# Patient Record
Sex: FEMALE | Race: BLACK OR AFRICAN AMERICAN | NOT HISPANIC OR LATINO | Employment: FULL TIME | URBAN - METROPOLITAN AREA
[De-identification: names, ages, dates, MRNs, and addresses within clinical notes are randomized per-mention and may not be internally consistent; named-entity substitution may affect disease eponyms.]

---

## 2017-01-03 ENCOUNTER — PATIENT MESSAGE (OUTPATIENT)
Dept: RHEUMATOLOGY | Facility: CLINIC | Age: 22
End: 2017-01-03

## 2017-01-03 ENCOUNTER — PATIENT MESSAGE (OUTPATIENT)
Dept: OBSTETRICS AND GYNECOLOGY | Facility: CLINIC | Age: 22
End: 2017-01-03

## 2017-01-16 ENCOUNTER — OFFICE VISIT (OUTPATIENT)
Dept: RHEUMATOLOGY | Facility: CLINIC | Age: 22
End: 2017-01-16
Payer: COMMERCIAL

## 2017-01-16 ENCOUNTER — LAB VISIT (OUTPATIENT)
Dept: LAB | Facility: HOSPITAL | Age: 22
End: 2017-01-16
Attending: INTERNAL MEDICINE
Payer: COMMERCIAL

## 2017-01-16 ENCOUNTER — CLINICAL SUPPORT (OUTPATIENT)
Dept: OBSTETRICS AND GYNECOLOGY | Facility: CLINIC | Age: 22
End: 2017-01-16
Payer: COMMERCIAL

## 2017-01-16 VITALS
DIASTOLIC BLOOD PRESSURE: 73 MMHG | HEART RATE: 82 BPM | BODY MASS INDEX: 29.48 KG/M2 | SYSTOLIC BLOOD PRESSURE: 112 MMHG | WEIGHT: 161.19 LBS

## 2017-01-16 DIAGNOSIS — D75.89 BICYTOPENIA: ICD-10-CM

## 2017-01-16 DIAGNOSIS — M32.19 OTHER SYSTEMIC LUPUS ERYTHEMATOSUS WITH OTHER ORGAN INVOLVEMENT: ICD-10-CM

## 2017-01-16 DIAGNOSIS — Z79.899 LONG-TERM USE OF HIGH-RISK MEDICATION: ICD-10-CM

## 2017-01-16 DIAGNOSIS — M32.10 SYSTEMIC LUPUS ERYTHEMATOSUS WITH ORGAN SYSTEM INVOLVEMENT: ICD-10-CM

## 2017-01-16 DIAGNOSIS — M32.9 SLE (SYSTEMIC LUPUS ERYTHEMATOSUS): ICD-10-CM

## 2017-01-16 DIAGNOSIS — M32.10 SYSTEMIC LUPUS ERYTHEMATOSUS WITH ORGAN SYSTEM INVOLVEMENT: Primary | ICD-10-CM

## 2017-01-16 LAB
BILIRUB UR QL STRIP: NEGATIVE
CLARITY UR: CLEAR
COLOR UR: YELLOW
GLUCOSE UR QL STRIP: NEGATIVE
HGB UR QL STRIP: NEGATIVE
KETONES UR QL STRIP: NEGATIVE
LEUKOCYTE ESTERASE UR QL STRIP: NEGATIVE
NITRITE UR QL STRIP: NEGATIVE
PH UR STRIP: 6 [PH] (ref 5–8)
PROT UR QL STRIP: NEGATIVE
SP GR UR STRIP: <=1.005 (ref 1–1.03)
URN SPEC COLLECT METH UR: ABNORMAL

## 2017-01-16 PROCEDURE — 1159F MED LIST DOCD IN RCRD: CPT | Mod: S$GLB,,, | Performed by: INTERNAL MEDICINE

## 2017-01-16 PROCEDURE — 81003 URINALYSIS AUTO W/O SCOPE: CPT | Mod: PO

## 2017-01-16 PROCEDURE — 99214 OFFICE O/P EST MOD 30 MIN: CPT | Mod: S$GLB,,, | Performed by: INTERNAL MEDICINE

## 2017-01-16 PROCEDURE — 84156 ASSAY OF PROTEIN URINE: CPT

## 2017-01-16 PROCEDURE — 96372 THER/PROPH/DIAG INJ SC/IM: CPT | Mod: S$GLB,,, | Performed by: OBSTETRICS & GYNECOLOGY

## 2017-01-16 PROCEDURE — 99999 PR PBB SHADOW E&M-EST. PATIENT-LVL III: CPT | Mod: PBBFAC,,, | Performed by: INTERNAL MEDICINE

## 2017-01-16 RX ORDER — AZATHIOPRINE 50 MG/1
50 TABLET ORAL 2 TIMES DAILY
Qty: 60 TABLET | Refills: 1 | Status: SHIPPED | OUTPATIENT
Start: 2017-01-16

## 2017-01-16 RX ADMIN — MEDROXYPROGESTERONE ACETATE 150 MG: 150 INJECTION, SUSPENSION INTRAMUSCULAR at 08:01

## 2017-01-16 NOTE — PROGRESS NOTES
RHEUMATOLOGY CLINIC FOLLOW UP VISIT  Chief complaints:-  To follow up for lupus.      HPI:-  Dariela Hernandez a 21 y.o. pleasant female comes in for a follow up visit with above chief complaints.  She has significant systemic lupus erythematosus with arthralgias, photosensitivity, leukopenia, anemia, elevated ESR, elevated CRP.  She was started on CellCept after which her white count dropped which could be from lupus activity/rarely from CellCept. She also felt worsening of her fatigue and attributed it to cellcept. So, Cellcept was put on hold and she was given pulse steroid. Then her counts improved and she was restarted on low dose cellcept in last visit which again caused severe fatigue and was discontinued. She was supposed to have started imuran which was given in last , but reports that the pharmacy didn't have the script and so she didn't took it. On plaquenil without any problems today. No joint pains, skin rash, fatigue, muscle pain or chest pain.     Review of Systems   Constitutional: Negative for chills, fever, malaise/fatigue and weight loss.   HENT: Negative for congestion, ear discharge, ear pain, hearing loss, nosebleeds and sore throat.    Eyes: Negative for blurred vision, double vision, photophobia, discharge and redness.   Respiratory: Negative for cough, hemoptysis, sputum production and shortness of breath.    Cardiovascular: Negative for chest pain, palpitations and claudication.   Gastrointestinal: Negative for abdominal pain, constipation, diarrhea, melena, nausea and vomiting.   Genitourinary: Negative for dysuria, frequency, hematuria and urgency.   Musculoskeletal: Negative for back pain, falls, joint pain, myalgias and neck pain.   Skin: Negative for itching and rash.   Neurological: Negative for dizziness, tremors, sensory change, speech change, focal weakness, seizures, loss of consciousness, weakness and headaches.    Endo/Heme/Allergies: Negative for environmental allergies. Does not bruise/bleed easily.   Psychiatric/Behavioral: Negative for hallucinations and memory loss. The patient does not have insomnia.        Past Medical History   Diagnosis Date    Herpes     Lupus        History reviewed. No pertinent past surgical history.     Social History   Substance Use Topics    Smoking status: Never Smoker    Smokeless tobacco: None    Alcohol use Yes      Comment: rarely       Family History   Problem Relation Age of Onset    Breast cancer Paternal Grandmother     Lupus Paternal Grandmother     Ovarian cancer Neg Hx        Review of patient's allergies indicates:  No Known Allergies        Physical examination:-    Vitals:    01/16/17 1421   BP: 112/73   Pulse: 82   Weight: 73.1 kg (161 lb 2.5 oz)   PainSc: 0-No pain       Physical Exam   Constitutional: She is oriented to person, place, and time and well-developed, well-nourished, and in no distress. No distress.   HENT:   Head: Normocephalic and atraumatic.   Mouth/Throat: Oropharynx is clear and moist. No oropharyngeal exudate.   Eyes: Conjunctivae and EOM are normal. Pupils are equal, round, and reactive to light. Right eye exhibits no discharge. Left eye exhibits no discharge. No scleral icterus.   Neck: Normal range of motion. Neck supple.   Cardiovascular: Normal rate and intact distal pulses.    Pulmonary/Chest: Effort normal. No respiratory distress. She exhibits no tenderness.   Abdominal: Soft. There is no tenderness.   Musculoskeletal:   No synovitis in small joints. No effusion in large joints. No dactylitis.    Lymphadenopathy:     She has no cervical adenopathy.   Neurological: She is alert and oriented to person, place, and time. No cranial nerve deficit.   Skin: Skin is warm. No rash noted. She is not diaphoretic. No erythema. No pallor.   Psychiatric: Mood, memory, affect and judgment normal.   Nursing note and vitals reviewed.      Labs:-  Results  for ALAN BOOTHE (MRN [id number]) as of 7/7/2016 09:47   Ref. Range 11/19/2015 14:50 12/29/2015 15:43 3/8/2016 08:24 4/7/2016 09:17   JOSE M HEP-2 Titer Unknown Positive >=1:2560...      Anti-SSA Antibody Latest Ref Range: 0.00 - 19.99 .68 (H)      Anti-SSA Interpretation Latest Ref Range: Negative  Positive (A)      Anti-SSB Antibody Latest Ref Range: 0.00 - 19.99 EU 8.93      Anti-SSB Interpretation Latest Ref Range: Negative  Negative      ds DNA Ab Latest Ref Range: Negative 1:10  Positive 1:640 (A)      Anti Sm Antibody Latest Ref Range: 0.00 - 19.99 EU 29.05 (H)      Anti-Sm Interpretation Latest Ref Range: Negative  Positive (A)      Anti Sm/RNP Antibody Latest Ref Range: 0.00 - 19.99 .81 (H)      Anti-Sm/RNP Interpretation Latest Ref Range: Negative  Positive (A)      Complement (C-3) Latest Ref Range: 50 - 180 mg/dL 115 87 100 123   Complement (C-4) Latest Ref Range: 11 - 44 mg/dL 19 15 21 26   Results for ALAN BOOTHE (MRN [id number]) as of 7/7/2016 09:47   Ref. Range 11/19/2015 14:50 12/29/2015 15:43 3/8/2016 08:24 3/8/2016 08:24 3/17/2016 11:25 4/7/2016 09:17 4/7/2016 09:17   Sed Rate Latest Ref Range: 0 - 20 mm/Hr 110 (H) 42 (H) 55 (H) 55 (H) 52 (H) 70 (H)        Assessment/Plans:-  # SLE (systemic lupus erythematosus)  Multiple autoantibody positive lupus with hematological abnormalities like anemia, leukopenia and fatigue. TPMT normal. Start Imuran.  Consider restarting benlysta in future.    - CBC auto differential; Future  - Comprehensive metabolic panel; Future  - Urinalysis; Future  - Protein / creatinine ratio, urine; Future    # Long-term use of high-risk medication  Safety labs.  - Comprehensive metabolic panel; Future  - CBC auto differential; Future    # Bicytopenia  Refer to our hematologist  for expert opinion.   - Ambulatory Referral to Hematology / Oncology     # RTC in 3 months or sooner with symptoms.   Disclaimer: This note was prepared using voice  recognition system and is likely to have sound alike errors and is not proof read.  Please call me with any questions.

## 2017-01-16 NOTE — PROGRESS NOTES
Two pt identifiers verified  pt notified to wait in clinic for 15 minutes after receiving injection, pt verbalized understanding  150mg/mL of Depo Provera administered IM to pt's left ventrogluteal.   Pt tolerated well  Next injection scheduled for 4/10/17 at 0800

## 2017-01-16 NOTE — MR AVS SNAPSHOT
[facility]  [address] [place] [address]  Phone: [phone]  Fax: [phone]                  Dariela Gaitan   2017 2:30 PM   Office Visit    Description:  Female : 1995   Provider:  Bruno Zamora MD   Department:  [facility]ology           Reason for Visit     Disease Management           Diagnoses this Visit        Comments    Systemic lupus erythematosus with organ system involvement    -  Primary     Long-term use of high-risk medication         Other systemic lupus erythematosus with other organ involvement                To Do List           Future Appointments        Provider Department Dept Phone    4/10/2017 8:00 AM [phone]    10/2/2017 1:40 PM Selena Pérez MD [facility] Internal Medicine [phone]      Goals (5 Years of Data)     None       These Medications        Disp Refills Start End    azathioprine (IMURAN) 50 mg Tab 60 tablet 1 2017     Take 1 tablet (50 mg total) by mouth 2 (two) times daily. - Oral    Pharmacy: [facility] [address] [place] [address] [facility] [place] -  [address] AT [facility] Ph #: [phone]         [facility]     [facility] Nurse Care Line -  Assistance  Registered nurses in the [facility] provide clinical advisement, health education, appointment booking, and other advisory services.  Call for this free service at [phone].             Medications           Message regarding Medications     Verify the changes and/or additions to your medication regime listed below are the same as discussed with your clinician today.  If any of these changes or additions are incorrect, please notify your healthcare provider.             Verify that the below list of medications is an accurate representation of the medications you are currently taking.  If none reported, the list may be blank. If incorrect, please contact your  healthcare provider. Carry this list with you in case of emergency.           Current Medications     azathioprine (IMURAN) 50 mg Tab Take 1 tablet (50 mg total) by mouth 2 (two) times daily.    CALCIUM CARBONATE (CALCIUM 500 ORAL) Take by mouth once daily.    ERGOCALCIFEROL, VITAMIN D2, (VITAMIN D ORAL) Take 200 mg by mouth.    hydroxychloroquine (PLAQUENIL) 200 mg tablet Take 1 tablet (200 mg total) by mouth 2 (two) times daily.    IRON, FERROUS SULFATE, ORAL Take by mouth.    valacyclovir (VALTREX) 1000 MG tablet Take 1 tablet (1,000 mg total) by mouth every 12 (twelve) hours.    zinc gluconate 50 mg tablet Take 50 mg by mouth once daily.           Clinical Reference Information           Vital Signs - Last Recorded  Most recent update: 1/16/2017  2:22 PM by Phil Tripathi LPN    BP Pulse Wt BMI       112/73 82 73.1 kg (161 lb 2.5 oz) 29.48 kg/m2       Blood Pressure          Most Recent Value    BP  112/73      Allergies as of 1/16/2017     No Known Allergies      Immunizations Administered on Date of Encounter - 1/16/2017     None

## 2017-01-17 LAB
CREAT UR-MCNC: 62 MG/DL
PROT UR-MCNC: <7 MG/DL
PROT/CREAT RATIO, UR: NORMAL

## 2017-01-23 ENCOUNTER — TELEPHONE (OUTPATIENT)
Dept: RHEUMATOLOGY | Facility: CLINIC | Age: 22
End: 2017-01-23

## 2017-01-23 NOTE — TELEPHONE ENCOUNTER
----- Message from Phil Tripathi LPN sent at 1/23/2017  7:40 AM CST -----  Regarding: Notification of Unviewed Test Results  Contact: [phone]  Entered by Bruno Zamora MD at 1/17/2017  8:18 AM      Stable results.

## 2017-01-23 NOTE — TELEPHONE ENCOUNTER
----- Message from Phil Tripathi LPN sent at 1/23/2017  7:41 AM CST -----  Regarding: Notification of Unviewed Test Results  Contact: [phone]  Entered by Bruno Zamora MD at 1/17/2017  8:19 AM      Normal.

## 2017-01-30 ENCOUNTER — HOSPITAL ENCOUNTER (EMERGENCY)
Facility: HOSPITAL | Age: 22
Discharge: HOME | End: 2017-01-30
Attending: EMERGENCY MEDICINE
Payer: COMMERCIAL

## 2017-01-30 VITALS
HEIGHT: 62 IN | RESPIRATION RATE: 18 BRPM | OXYGEN SATURATION: 98 % | WEIGHT: 160 LBS | SYSTOLIC BLOOD PRESSURE: 129 MMHG | DIASTOLIC BLOOD PRESSURE: 73 MMHG | HEART RATE: 93 BPM | BODY MASS INDEX: 29.44 KG/M2 | TEMPERATURE: 98 F

## 2017-01-30 DIAGNOSIS — L50.9 URTICARIA: Primary | ICD-10-CM

## 2017-01-30 DIAGNOSIS — L29.9 ITCHING: ICD-10-CM

## 2017-01-30 PROCEDURE — 99283 EMERGENCY DEPT VISIT LOW MDM: CPT

## 2017-01-30 RX ORDER — DIPHENHYDRAMINE HCL 25 MG
25 CAPSULE ORAL EVERY 6 HOURS PRN
Qty: 20 CAPSULE | Refills: 0 | Status: SHIPPED | OUTPATIENT
Start: 2017-01-30

## 2017-01-30 RX ORDER — PREDNISONE 50 MG/1
50 TABLET ORAL DAILY
Qty: 5 TABLET | Refills: 0 | Status: SHIPPED | OUTPATIENT
Start: 2017-01-30 | End: 2017-02-04

## 2017-01-30 NOTE — ED PROVIDER NOTES
"SCRIBE #1 NOTE: I, James Rizvi Salazar, am scribing for, and in the presence of, Cornelio Membreno MD. I have scribed the entire note.      History      Chief Complaint   Patient presents with    Itching     generalized itching that began last night. hives yesterday that is better. some "tightness to hands and elbows", Hx of lupus       Review of patient's allergies indicates:  No Known Allergies     HPI   HPI    1/30/2017, 9:56 AM   History obtained from the patient      History of Present Illness: Dariela Gaitan is a 21 y.o. female patient with a PMHx of lupus, who presents to the Emergency Department for itching which onset gradually last night. Sxs are episodic and moderate in severity. Pt reports rash and itching to neck, hands, and bilateral upper extremities. Pt does not have rash at this time. Pt describes rash as large red raised areas. Pt states that she does not have any known allergies. There are no mitigating or exacerbating factors noted. No associated sxs.  Pt denies any fever, N/V/D, dizziness, abd pain, CP, facial swelling, numbness, trouble swallowing, cough, SOB, and all other sxs at this time. No further complaints or concerns at this time.       Arrival mode: Personal vehicle      PCP: Selena Pérez MD       Past Medical History:  Past Medical History   Diagnosis Date    Herpes     Lupus        Past Surgical History:  Unknown    Family History:  Family History   Problem Relation Age of Onset    Breast cancer Paternal Grandmother     Lupus Paternal Grandmother     Ovarian cancer Neg Hx        Social History:  Social History     Social History Main Topics    Smoking status: Never Smoker    Smokeless tobacco: Unknown    Alcohol use Yes      Comment: rarely    Drug use: No    Sexual activity: Yes     Partners: Male     Birth control/ protection: Injection       ROS   Review of Systems   Constitutional: Negative for fever.   HENT: Negative for facial swelling, sore throat and " "trouble swallowing.    Respiratory: Negative for cough and shortness of breath.    Cardiovascular: Negative for chest pain.   Gastrointestinal: Negative for abdominal pain, diarrhea, nausea and vomiting.   Genitourinary: Negative for dysuria.   Musculoskeletal: Negative for back pain.   Skin: Positive for rash (itching).   Neurological: Negative for syncope, weakness, numbness and headaches.   Hematological: Does not bruise/bleed easily.       Physical Exam    Initial Vitals   BP Pulse Resp Temp SpO2   01/30/17 0949 01/30/17 0949 01/30/17 0949 01/30/17 0949 01/30/17 0949   129/73 93 18 97.9 °F (36.6 °C) 98 %      Physical Exam  Nursing Notes and Vital Signs Reviewed.  Constitutional: Patient is in no acute distress. Awake and alert. Well-developed and well-nourished.  Head: Atraumatic. Normocephalic.  Eyes: PERRL. EOM intact. Conjunctivae are not pale. No scleral icterus.  ENT: Mucous membranes are moist. Oropharynx is clear and symmetric.    Neck: Supple. Full ROM. No lymphadenopathy.  Cardiovascular: Regular rate. Regular rhythm. No murmurs, rubs, or gallops. Distal pulses are 2+ and symmetric.  Pulmonary/Chest: No respiratory distress. Clear to auscultation bilaterally. No wheezing, rales, or rhonchi.  Abdominal: Soft and non-distended.  There is no tenderness.  No rebound, guarding, or rigidity.  Good bowel sounds.    Genitourinary: No CVA tenderness  Musculoskeletal: Moves all extremities. No obvious deformities. No edema. No calf tenderness.  Skin: Warm and dry. No rash appreciated currently.  Neurological:  Alert, awake, and appropriate.  Normal speech.  No acute focal neurological deficits are appreciated.  Psychiatric: Normal affect. Good eye contact. Appropriate in content.    ED Course    Procedures  ED Vital Signs:  Vitals:    01/30/17 0949   BP: 129/73   Pulse: 93   Resp: 18   Temp: 97.9 °F (36.6 °C)   TempSrc: Oral   SpO2: 98%   Weight: 72.6 kg (160 lb)   Height: 5' 2" (1.575 m)                The " Emergency Provider reviewed the vital signs and test results, which are outlined above.    ED Discussion     10:04 AM: Discussed plan of treatment with pt. Pt given the option to take Rx steroids. Gave pt all f/u and return to the ED instructions. All questions and concerns were addressed at this time. Pt understands and agrees to plan as discussed. Pt is stable for discharge.       ED Medication(s):  Medications - No data to display    Discharge Medication List as of 1/30/2017 10:01 AM      START taking these medications    Details   diphenhydrAMINE (BENADRYL) 25 mg capsule Take 1 each (25 mg total) by mouth every 6 (six) hours as needed for Itching., Starting 1/30/2017, Until Discontinued, Print      predniSONE (DELTASONE) 50 MG Tab Take 1 tablet (50 mg total) by mouth once daily., Starting 1/30/2017, Until Sat 2/4/17, Print             Follow-up Information     Follow up with Selena Pérez MD In 2 days.    Specialty:  Internal Medicine    Contact information:    [address] [place] [address] [address] [place] [id number] [phone]              Medical Decision Making              Scribe Attestation:   Scribe #1: I performed the above scribed service and the documentation accurately describes the services I performed. I attest to the accuracy of the note.    Attending:   Physician Attestation Statement for Scribe #1: I, Cornelio Membreno MD, personally performed the services described in this documentation, as scribed by James Salazar, in my presence, and it is both accurate and complete.          Clinical Impression       ICD-10-CM ICD-9-CM   1. Urticaria L50.9 708.9   2. Itching L29.9 698.9       Disposition:   Disposition: Discharged  Condition: Stable         Cornelio Membreno MD  01/30/17 1016

## 2017-01-30 NOTE — ED AVS SNAPSHOT
[facility] -   [address]  [place] [address]               Dariela Gaitan   2017  9:49 AM   ED    Description:  Female : 1995   Department:  [facility]           Your Care was Coordinated By:     Provider Role From To    Cornelio Membreno MD Attending Provider 17 0949 --      Reason for Visit     Itching           Diagnoses this Visit        Comments    Urticaria    -  Primary       ED Disposition     ED Disposition Condition Comment    Discharge             To Do List           Follow-up Information     Follow up with Selena Pérez MD In 2 days.    Specialty:  Internal Medicine    Contact information:    [address] [place] [address] [address] [place] [address]  [phone]         These Medications        Disp Refills Start End    predniSONE (DELTASONE) 50 MG Tab 5 tablet 0 2017    Take 1 tablet (50 mg total) by mouth once daily. - Oral    Pharmacy: [facility] [id number] [place] [address] [place] [address] AT [facility] Ph #: [phone]       diphenhydrAMINE (BENADRYL) 25 mg capsule 20 capsule 0 2017     Take 1 each (25 mg total) by mouth every 6 (six) hours as needed for Itching. - Oral    [facility] [address] [facility] [place] [address] [facility] Ph #: [phone]         [facility]     [facility] Care Line -  Assistance  Registered nurses in the [facility] provide clinical advisement, health education, appointment booking, and other advisory services.  Call for this free service at [phone].             Medications           Message regarding Medications     Verify the changes and/or additions to your medication regime listed below are the same as discussed with your clinician today.  If any of these changes or additions are incorrect, please notify your healthcare provider.        START taking  "these NEW medications        Refills    predniSONE (DELTASONE) 50 MG Tab 0    Sig: Take 1 tablet (50 mg total) by mouth once daily.    Class: Print    Route: Oral    diphenhydrAMINE (BENADRYL) 25 mg capsule 0    Sig: Take 1 each (25 mg total) by mouth every 6 (six) hours as needed for Itching.    Class: Print    Route: Oral           Verify that the below list of medications is an accurate representation of the medications you are currently taking.  If none reported, the list may be blank. If incorrect, please contact your healthcare provider. Carry this list with you in case of emergency.           Current Medications     azathioprine (IMURAN) 50 mg Tab Take 1 tablet (50 mg total) by mouth 2 (two) times daily.    CALCIUM CARBONATE (CALCIUM 500 ORAL) Take by mouth once daily.    diphenhydrAMINE (BENADRYL) 25 mg capsule Take 1 each (25 mg total) by mouth every 6 (six) hours as needed for Itching.    ERGOCALCIFEROL, VITAMIN D2, (VITAMIN D ORAL) Take 200 mg by mouth.    hydroxychloroquine (PLAQUENIL) 200 mg tablet Take 1 tablet (200 mg total) by mouth 2 (two) times daily.    IRON, FERROUS SULFATE, ORAL Take by mouth.    medroxyPROGESTERone (DEPO-PROVERA) injection 150 mg Inject 1 mL (150 mg total) into the muscle every 3 (three) months.    medroxyPROGESTERone (DEPO-PROVERA) injection 150 mg Inject 1 mL (150 mg total) into the muscle every 3 (three) months.    predniSONE (DELTASONE) 50 MG Tab Take 1 tablet (50 mg total) by mouth once daily.    valacyclovir (VALTREX) 1000 MG tablet Take 1 tablet (1,000 mg total) by mouth every 12 (twelve) hours.    zinc gluconate 50 mg tablet Take 50 mg by mouth once daily.           Clinical Reference Information           Your Vitals Were     BP Pulse Temp Resp Height Weight    129/73 (BP Location: Right arm, Patient Position: Sitting) 93 97.9 °F (36.6 °C) (Oral) 18 5' 2" (1.575 m) 72.6 kg (160 lb)    SpO2 BMI             98% 29.26 kg/m2         Allergies as of 1/30/2017     No Known " Allergies      Immunizations Administered on Date of Encounter - 1/30/2017     None      ED Micro, Lab, POCT     None      ED Imaging Orders     None        Discharge Instructions         Hives (Adult)  Hives are pink or red bumps on the skin. These bumps are also known as wheals. The bumps can itch, burn, or sting. Hives can occur anywhere on the body. They vary in size and shape and can form in clusters. Individual hives can appear and go away quickly. New hives may develop as old ones fade. Hives are common and usually harmless. Occasionally hives are a sign of a serious allergy.  Hives are often caused by an allergic reaction. It may be an allergic reaction to foods such as fruit, shellfish, chocolate, nuts, or tomatoes. It may be a reaction to pollens, animal fur, or mold spores. Medications, chemicals, and insect bites can also cause hives. And hives can be caused by hot sun or cold air. The cause of hives can be difficult to find.  You may be given medications to relieve swelling and itching. Follow all instructions when using these medications. The hives will fade in a few days, but can last up to 2 weeks.  Home care  Follow these tips:  · Try to find the cause of the hives and eliminate it. Discuss possible causes with your health care provider. Future reactions to the same allergen may be worse.  · Dont scratch the hives. Scratching will delay healing. To reduce itching, apply cool, wet compresses to the skin.  · Dress in soft, loose cotton clothing.  · Dont bathe in hot water. This can make the itching worse.  · Apply an ice pack or cool pack wrapped in a thin towel to your skin. This will help reduce redness and itching.  · Try a topical spray or cream with benzocaine to help reduce itching.  · Use oral diphenhydramine to help reduce itching. This is an antihistamine you can buy at drug and grocery stores. It can make you sleepy, so use lower doses during the daytime. Or you can use loratadine. This  is an antihistamine that will not make you sleepy. Dont use diphenhydramine if you have glaucoma or have trouble urinating because of an enlarged prostate.  Follow-up care  Follow up with your health care provider if your symptoms don't get better in 2 days. Ask your provider about allergy testing if you have had a severe reaction, or have had several episodes of hives. He or she can use the allergy testing to find out what you are allergic to.  When to seek medical advice  Call your health care provider right away if any of these occur:  · Fever of 100.4°F (38.0°C) or higher  · Swelling of the face, throat, or tongue  · Trouble breathing or swallowing  · Redness, swelling, or pain  · Foul-smelling fluid coming from the rash  · Dizziness, weakness, or fainting  © 4320-6714 [employer]. [address] [place] [address]. All rights reserved. This information is not intended as a substitute for professional medical care. Always follow your healthcare professional's instructions.          Your Scheduled Appointments     Apr 10, 2017  8:00 AM CDT   Nurse Visit with OB GYN [facility]UR[facility] - OB/ GYN ([facility])    [address] [place] [address]   [phone]            Oct 02, 2017  1:40 PM CDT   Established Patient Visit with MD LUPIS [facility] - Internal Medicine ([facility] [address]   [phone]               [facility] -  complies with applicable Federal civil rights laws and does not discriminate on the basis of race, color, national origin, age, disability, or sex.        Language Assistance Services     ATTENTION: Language assistance services are available, free of charge. Please call [phone].      ATENCIÓN: Si habla charline, tiene a lacey disposición servicios gratuitos de asistencia lingüística. Llame al [phone].     CHÚ Ý: N?u b?n nói Ti?ng Vi?t, có các d?ch v? h? tr? ngôn ng? mi?n  phí dành cho b?stephany. G?i s? [phone].

## 2017-01-30 NOTE — DISCHARGE INSTRUCTIONS
Hives (Adult)  Hives are pink or red bumps on the skin. These bumps are also known as wheals. The bumps can itch, burn, or sting. Hives can occur anywhere on the body. They vary in size and shape and can form in clusters. Individual hives can appear and go away quickly. New hives may develop as old ones fade. Hives are common and usually harmless. Occasionally hives are a sign of a serious allergy.  Hives are often caused by an allergic reaction. It may be an allergic reaction to foods such as fruit, shellfish, chocolate, nuts, or tomatoes. It may be a reaction to pollens, animal fur, or mold spores. Medications, chemicals, and insect bites can also cause hives. And hives can be caused by hot sun or cold air. The cause of hives can be difficult to find.  You may be given medications to relieve swelling and itching. Follow all instructions when using these medications. The hives will fade in a few days, but can last up to 2 weeks.  Home care  Follow these tips:  · Try to find the cause of the hives and eliminate it. Discuss possible causes with your health care provider. Future reactions to the same allergen may be worse.  · Dont scratch the hives. Scratching will delay healing. To reduce itching, apply cool, wet compresses to the skin.  · Dress in soft, loose cotton clothing.  · Dont bathe in hot water. This can make the itching worse.  · Apply an ice pack or cool pack wrapped in a thin towel to your skin. This will help reduce redness and itching.  · Try a topical spray or cream with benzocaine to help reduce itching.  · Use oral diphenhydramine to help reduce itching. This is an antihistamine you can buy at drug and grocery stores. It can make you sleepy, so use lower doses during the daytime. Or you can use loratadine. This is an antihistamine that will not make you sleepy. Dont use diphenhydramine if you have glaucoma or have trouble urinating because of an enlarged prostate.  Follow-up care  Follow up  with your health care provider if your symptoms don't get better in 2 days. Ask your provider about allergy testing if you have had a severe reaction, or have had several episodes of hives. He or she can use the allergy testing to find out what you are allergic to.  When to seek medical advice  Call your health care provider right away if any of these occur:  · Fever of 100.4°F (38.0°C) or higher  · Swelling of the face, throat, or tongue  · Trouble breathing or swallowing  · Redness, swelling, or pain  · Foul-smelling fluid coming from the rash  · Dizziness, weakness, or fainting  © 3329-6626 [employer]. [address] [place] [address], [place] [address]. All rights reserved. This information is not intended as a substitute for professional medical care. Always follow your healthcare professional's instructions.

## 2017-03-29 ENCOUNTER — PATIENT MESSAGE (OUTPATIENT)
Dept: OBSTETRICS AND GYNECOLOGY | Facility: CLINIC | Age: 22
End: 2017-03-29

## 2017-04-04 ENCOUNTER — PATIENT MESSAGE (OUTPATIENT)
Dept: RHEUMATOLOGY | Facility: CLINIC | Age: 22
End: 2017-04-04

## 2017-04-10 ENCOUNTER — CLINICAL SUPPORT (OUTPATIENT)
Dept: OBSTETRICS AND GYNECOLOGY | Facility: CLINIC | Age: 22
End: 2017-04-10
Payer: COMMERCIAL

## 2017-04-10 PROCEDURE — 96372 THER/PROPH/DIAG INJ SC/IM: CPT | Mod: S$GLB,,, | Performed by: OBSTETRICS & GYNECOLOGY

## 2017-04-10 RX ADMIN — MEDROXYPROGESTERONE ACETATE 150 MG: 150 INJECTION, SUSPENSION INTRAMUSCULAR at 08:04

## 2017-04-10 NOTE — PROGRESS NOTES
Two pt identifiers verified  pt notified to wait in clinic for 15 minutes after receiving injection, pt verbalized understanding  150mg/mL of Depo Provera administered IM to pt's left ventrogluteal.   Pt tolerated well  Next injection scheduled for 07/06/2017 at 0800

## 2017-04-25 ENCOUNTER — HOSPITAL ENCOUNTER (EMERGENCY)
Facility: HOSPITAL | Age: 22
Discharge: HOME | End: 2017-04-25
Attending: EMERGENCY MEDICINE
Payer: COMMERCIAL

## 2017-04-25 VITALS
RESPIRATION RATE: 19 BRPM | SYSTOLIC BLOOD PRESSURE: 131 MMHG | HEART RATE: 92 BPM | HEIGHT: 62 IN | DIASTOLIC BLOOD PRESSURE: 71 MMHG | BODY MASS INDEX: 29.44 KG/M2 | OXYGEN SATURATION: 100 % | TEMPERATURE: 99 F | WEIGHT: 160 LBS

## 2017-04-25 DIAGNOSIS — R07.9 CHEST PAIN: ICD-10-CM

## 2017-04-25 DIAGNOSIS — R07.89 ATYPICAL CHEST PAIN: Primary | ICD-10-CM

## 2017-04-25 DIAGNOSIS — R52 PAIN: ICD-10-CM

## 2017-04-25 PROCEDURE — 93010 ELECTROCARDIOGRAM REPORT: CPT | Mod: ,,, | Performed by: INTERNAL MEDICINE

## 2017-04-25 PROCEDURE — 99284 EMERGENCY DEPT VISIT MOD MDM: CPT | Mod: 25

## 2017-04-25 PROCEDURE — 93005 ELECTROCARDIOGRAM TRACING: CPT

## 2017-04-25 NOTE — ED AVS SNAPSHOT
[facility]  [address] [facility] [place] [address]               Dariela Gaitan   2017  6:19 PM   ED    Description:  Female : 1995   Department:  [facility]            Your Care was Coordinated By:     Provider Role From To    Fidencio Mercer Jr., MD Attending Provider 17 1829 --      Reason for Visit     Chest Pain           Diagnoses this Visit        Comments    Atypical chest pain    -  Primary     Chest pain         Pain           ED Disposition     ED Disposition Condition Comment    Discharge             To Do List           Follow-up Information     Follow up with Selena Pérez MD. Call in 2 days.    Specialty:  Internal Medicine    Contact information:    [address] [place] [address] DR Katelynn BLOOM [address]  [phone]        [facility]     [facility] -  Assistance  Unless otherwise directed by your provider, please contact [facility], our nurse care line that is available for  assistance.     Registered nurses in the [facility] provide: appointment scheduling, clinical advisement, health education, and other advisory services.  Call: [phone] (toll free)               Medications           Message regarding Medications     Verify the changes and/or additions to your medication regime listed below are the same as discussed with your clinician today.  If any of these changes or additions are incorrect, please notify your healthcare provider.             Verify that the below list of medications is an accurate representation of the medications you are currently taking.  If none reported, the list may be blank. If incorrect, please contact your healthcare provider. Carry this list with you in case of emergency.           Current Medications     azathioprine (IMURAN) 50 mg Tab Take 1 tablet (50 mg total) by mouth 2 (two) times daily.    CALCIUM CARBONATE (CALCIUM 500 ORAL)  "Take by mouth once daily.    diphenhydrAMINE (BENADRYL) 25 mg capsule Take 1 each (25 mg total) by mouth every 6 (six) hours as needed for Itching.    ERGOCALCIFEROL, VITAMIN D2, (VITAMIN D ORAL) Take 200 mg by mouth.    hydroxychloroquine (PLAQUENIL) 200 mg tablet Take 1 tablet (200 mg total) by mouth 2 (two) times daily.    IRON, FERROUS SULFATE, ORAL Take by mouth.    medroxyPROGESTERone (DEPO-PROVERA) injection 150 mg Inject 1 mL (150 mg total) into the muscle every 3 (three) months.    medroxyPROGESTERone (DEPO-PROVERA) injection 150 mg Inject 1 mL (150 mg total) into the muscle every 3 (three) months.    valacyclovir (VALTREX) 1000 MG tablet Take 1 tablet (1,000 mg total) by mouth every 12 (twelve) hours.    zinc gluconate 50 mg tablet Take 50 mg by mouth once daily.           Clinical Reference Information           Your Vitals Were     BP Pulse Temp Resp Height Weight    131/71 (BP Location: Right arm, Patient Position: Sitting) 92 98.6 °F (37 °C) (Oral) 19 5' 2" (1.575 m) 72.6 kg (160 lb)    SpO2 BMI             100% 29.26 kg/m2         Allergies as of 4/25/2017     No Known Allergies      Immunizations Administered on Date of Encounter - 4/25/2017     None      ED Micro, Lab, POCT     None      ED Imaging Orders     Start Ordered       Status Ordering Provider    04/25/17 1833 04/25/17 1832  X-Ray Chest PA And Lateral  1 time imaging      Final result         Discharge Instructions         Noncardiac Chest Pain    Based on your visit today, the health care provider doesnt know what is causing your chest pain. In most cases, people who come to the emergency department with chest pain dont have a problem with their heart. Instead, the pain is caused by other conditions. These may be problems with the lungs, muscles, bones, digestive tract, nerves, or mental health.  Lung problems  · Inflammation around the lungs (pleurisy)  · Collapsed lung (pneumothorax)  · Fluid around the lungs (pleural " effusion)  · Lung cancer. This is a rare cause of chest pain.  Muscle or bone problems  · Inflamed cartilage between the ribs (pleurisy)  · Fibromyalgia  · Rheumatoid arthritis  Digestive system problems  · Reflux  · Stomach ulcer  · Spasms of the esophagus  · Gall stones  · Gallbladder inflammation  Mental health conditions  · Panic or anxiety attacks  · Emotional distress  Your condition doesnt seem serious and your pain doesnt appear to be coming from your heart. But sometimes the signs of a serious problem take more time to appear. Watch for the warning signs listed below.  Home care  Follow these guidelines when caring for yourself at home:  · Rest today and avoid strenuous activity.  · Take any prescribed medicine as directed.  Follow-up care  Follow up with your health care provider, or as advised, if you dont start to feel better within 24 hours.  When to seek medical advice  Call your health care provider right away if any of these occur:  · A change in the type of pain. Call if it feels different, becomes more serious, lasts longer, or begins to spread into your shoulder, arm, neck, jaw, or back.  · Shortness of breath  · You feel more pain when you breathe  · Cough with dark-colored mucus or blood  · Weakness, dizziness, or fainting  · Fever of 100.4ºF (38ºC) or higher, or as directed by your health care provider  · Swelling, pain, or redness in one leg  Date Last Reviewed: 11/24/2014  © 6709-8016 [employer]. [address] [place] [address]. All rights reserved. This information is not intended as a substitute for professional medical care. Always follow your healthcare professional's instructions.          Your Scheduled Appointments     Jul 03, 2017  8:00 AM CDT   Nurse Visit with OB GYN NURSE, SUPRIYA Gaines - OB/ GYN ([facility])    [address] [facility] [id number] [phone]            Oct 02, 2017  1:40 PM CDT   Established Patient Visit with  MD LUPIS Salgado'Beck - Internal Medicine ([facility])    [address] [facility] [address] [phone]               [facility] complies with applicable Federal civil rights laws and does not discriminate on the basis of race, color, national origin, age, disability, or sex.        Language Assistance Services     ATTENTION: Language assistance services are available, free of charge. Please call [phone].      ATENCIÓN: Si habla español, tiene a lacey disposición servicios gratuitos de asistencia lingüística. Llame al [phone].     CHÚ Ý: N?u b?n nói Ti?ng Vi?t, có các d?ch v? h? tr? ngôn ng? mi?n phí dành cho b?n. G?i s? [phone].

## 2017-04-25 NOTE — DISCHARGE INSTRUCTIONS
Noncardiac Chest Pain    Based on your visit today, the health care provider doesnt know what is causing your chest pain. In most cases, people who come to the emergency department with chest pain dont have a problem with their heart. Instead, the pain is caused by other conditions. These may be problems with the lungs, muscles, bones, digestive tract, nerves, or mental health.  Lung problems  · Inflammation around the lungs (pleurisy)  · Collapsed lung (pneumothorax)  · Fluid around the lungs (pleural effusion)  · Lung cancer. This is a rare cause of chest pain.  Muscle or bone problems  · Inflamed cartilage between the ribs (pleurisy)  · Fibromyalgia  · Rheumatoid arthritis  Digestive system problems  · Reflux  · Stomach ulcer  · Spasms of the esophagus  · Gall stones  · Gallbladder inflammation  Mental health conditions  · Panic or anxiety attacks  · Emotional distress  Your condition doesnt seem serious and your pain doesnt appear to be coming from your heart. But sometimes the signs of a serious problem take more time to appear. Watch for the warning signs listed below.  Home care  Follow these guidelines when caring for yourself at home:  · Rest today and avoid strenuous activity.  · Take any prescribed medicine as directed.  Follow-up care  Follow up with your health care provider, or as advised, if you dont start to feel better within 24 hours.  When to seek medical advice  Call your health care provider right away if any of these occur:  · A change in the type of pain. Call if it feels different, becomes more serious, lasts longer, or begins to spread into your shoulder, arm, neck, jaw, or back.  · Shortness of breath  · You feel more pain when you breathe  · Cough with dark-colored mucus or blood  · Weakness, dizziness, or fainting  · Fever of 100.4ºF (38ºC) or higher, or as directed by your health care provider  · Swelling, pain, or redness in one leg  Date Last Reviewed: 11/24/2014  © 9730-3301  [employer]. [address] [place] [address], [place] [address]. All rights reserved. This information is not intended as a substitute for professional medical care. Always follow your healthcare professional's instructions.

## 2017-04-25 NOTE — ED PROVIDER NOTES
SCRIBE #1 NOTE: I, James Salazar, am scribing for, and in the presence of, Fidencio Mercer Jr., MD. I have scribed the entire note.      History      Chief Complaint   Patient presents with    Chest Pain     right sided chest pain that radiates to right arm x a few hours, describes pain as burning; also c/o left eye lid pain       Review of patient's allergies indicates:  No Known Allergies     HPI   HPI    4/25/2017, 6:36 PM   History obtained from the patient      History of Present Illness: Dariela Gaitan is a 21 y.o. female patient with a PMHx of lupus, RA, who presents to the Emergency Department for right sided CP which onset gradually today PTA. Sxs are constant and moderate in severity. Pt reports pain radiates to her back. There are no mitigating or exacerbating factors noted. Associated sxs include tingling in RUE.  Pt denies any fever, N/V/D, HA, SOB, cough, trauma, abd pain, arm pain,  and all other sxs at this time. No further complaints or concerns at this time.       Arrival mode: Personal vehicle     PCP: Selena Pérez MD       Past Medical History:  Past Medical History:   Diagnosis Date    Herpes     Lupus        Past Surgical History:  unknown    Family History:  Family History   Problem Relation Age of Onset    Breast cancer Paternal Grandmother     Lupus Paternal Grandmother     Ovarian cancer Neg Hx        Social History:  Social History     Social History Main Topics    Smoking status: Never Smoker    Smokeless tobacco: unknown    Alcohol use Yes      Comment: rarely    Drug use: No    Sexual activity: Yes     Partners: Male     Birth control/ protection: Injection       ROS   Review of Systems   Constitutional: Negative for fever.   HENT: Negative for sore throat.    Respiratory: Negative for shortness of breath.    Cardiovascular: Positive for chest pain (Right).   Gastrointestinal: Negative for abdominal pain, constipation, diarrhea, nausea and vomiting.  "  Genitourinary: Negative for dysuria.   Musculoskeletal: Negative for back pain.   Skin: Negative for rash.   Neurological: Negative for dizziness, seizures, syncope, weakness, numbness and headaches.        (+) RUE tingling   Hematological: Does not bruise/bleed easily.       Physical Exam    Initial Vitals   BP Pulse Resp Temp SpO2   04/25/17 1815 04/25/17 1815 04/25/17 1815 04/25/17 1815 04/25/17 1815   131/71 92 19 98.6 °F (37 °C) 100 %      Physical Exam  Nursing Notes and Vital Signs Reviewed.  Constitutional: Patient is in no acute distress. Awake and alert. Well-developed and well-nourished.  Head: Atraumatic. Normocephalic.  Eyes: PERRL. EOM intact. Conjunctivae are not pale. No scleral icterus.  ENT: Mucous membranes are moist. Oropharynx is clear and symmetric.    Neck: Supple. Full ROM. No lymphadenopathy.  Cardiovascular: Regular rate. Regular rhythm. No murmurs, rubs, or gallops. Distal pulses are 2+ and symmetric.  Pulmonary/Chest: No respiratory distress. Clear to auscultation bilaterally. No wheezing, rales, or rhonchi.  Abdominal: Soft and non-distended.  There is no tenderness.  No rebound, guarding, or rigidity. Good bowel sounds.  Genitourinary: No CVA tenderness  Musculoskeletal: Moves all extremities. No obvious deformities. No edema. No calf tenderness.  Skin: Warm and dry.  Neurological:  Alert, awake, and appropriate.  Normal speech.  No acute focal neurological deficits are appreciated.  Psychiatric: Normal affect. Good eye contact. Appropriate in content.    ED Course    Procedures  ED Vital Signs:  Vitals:    04/25/17 1815   BP: 131/71   Pulse: 92   Resp: 19   Temp: 98.6 °F (37 °C)   TempSrc: Oral   SpO2: 100%   Weight: 72.6 kg (160 lb)   Height: 5' 2" (1.575 m)     Imaging Results:  Imaging Results         X-Ray Chest PA And Lateral (Final result) Result time:  04/25/17 18:52:45    Final result by Damion Yap MD (04/25/17 18:52:45)    Impression:         No acute cardiopulmonary " disease.            Electronically signed by: ANGEL KRAFT MD  Date:     04/25/17  Time:    18:52     Narrative:    Exam: Two-view chest radiograph    Clinical History: R52 Pain, unspecified.  .      Findings:   The lungs are clear. The cardiac silhouette is within normal limits.                    The EKG was ordered, reviewed, and independently interpreted by the ED provider.  Interpretation time: 18:25  Rate: 81 BPM  Rhythm: normal sinus rhythm  Interpretation: No acute ST changes. No STEMI.      The Emergency Provider reviewed the vital signs and test results, which are outlined above.    ED Discussion     6:59 PM: Reassessed pt. Discussed with pt all pertinent ED information and results. Discussed plan of treatment with pt. Gave pt all f/u and return to the ED instructions. All questions and concerns were addressed at this time. Pt understands and agrees to plan as discussed. Pt is stable for discharge.     I have discussed with patient and/or family/caretaker chest pain precautions, specifically to return for worsening chest pain, shortness of breath, fever, or any concern.  I have low suspicion for cardiopulmonary, vascular, infectious, respiratory, or other emergent medical condition based on my evaluation in the ED.    ED Medication(s):  Medications - No data to display    Discharge Medication List as of 4/25/2017  6:57 PM          Follow-up Information     Follow up with Selena Pérez MD. Call in 2 days.    Specialty:  Internal Medicine    Contact information:    [address] [place] [address] DR Katelynn BLOOM [id number] [phone]              Medical Decision Making    Medical Decision Making:   Clinical Tests:   Radiological Study: Ordered and Reviewed  Medical Tests: Reviewed and Ordered           Scribe Attestation:   Scribe #1: I performed the above scribed service and the documentation accurately describes the services I performed. I attest to the accuracy of the note.    Attending:   Physician Attestation  Statement for Scribe #1: I, Fidencio Mercer Jr., MD, personally performed the services described in this documentation, as scribed by James Salazar, in my presence, and it is both accurate and complete.          Clinical Impression       ICD-10-CM ICD-9-CM   1. Atypical chest pain R07.89 786.59   2. Chest pain R07.9 786.50   3. Pain R52 780.96       Disposition:   Disposition: Discharged  Condition: Stable         Fidencio Mercer Jr., MD  04/25/17 4110

## 2017-07-24 ENCOUNTER — PATIENT MESSAGE (OUTPATIENT)
Dept: RHEUMATOLOGY | Facility: CLINIC | Age: 22
End: 2017-07-24

## 2017-07-24 DIAGNOSIS — M32.19 OTHER SYSTEMIC LUPUS ERYTHEMATOSUS WITH OTHER ORGAN INVOLVEMENT: ICD-10-CM

## 2017-07-24 DIAGNOSIS — M32.10 SYSTEMIC LUPUS ERYTHEMATOSUS WITH ORGAN SYSTEM INVOLVEMENT: Primary | ICD-10-CM

## 2017-07-24 RX ORDER — HYDROXYCHLOROQUINE SULFATE 200 MG/1
200 TABLET, FILM COATED ORAL 2 TIMES DAILY
Qty: 60 TABLET | Refills: 3 | Status: SHIPPED | OUTPATIENT
Start: 2017-07-24

## 2017-07-24 NOTE — TELEPHONE ENCOUNTER
Restart plaquenil. Please advice patient to go to [facility] clinic in the mean time if she couldn't come here, if her lupus flares / call us immediatly. Thanks.

## 2017-07-27 ENCOUNTER — PATIENT MESSAGE (OUTPATIENT)
Dept: OBSTETRICS AND GYNECOLOGY | Facility: CLINIC | Age: 22
End: 2017-07-27

## 2017-07-27 DIAGNOSIS — Z86.19 HISTORY OF HERPES GENITALIS: ICD-10-CM

## 2017-07-27 RX ORDER — VALACYCLOVIR HYDROCHLORIDE 1 G/1
1000 TABLET, FILM COATED ORAL EVERY 12 HOURS
Qty: 10 TABLET | Refills: 6 | Status: SHIPPED | OUTPATIENT
Start: 2017-07-27 | End: 2018-07-12 | Stop reason: SDUPTHER

## 2017-08-17 ENCOUNTER — PATIENT MESSAGE (OUTPATIENT)
Dept: OBSTETRICS AND GYNECOLOGY | Facility: CLINIC | Age: 22
End: 2017-08-17

## 2017-10-11 ENCOUNTER — PATIENT MESSAGE (OUTPATIENT)
Dept: RHEUMATOLOGY | Facility: CLINIC | Age: 22
End: 2017-10-11

## 2018-07-12 ENCOUNTER — PATIENT MESSAGE (OUTPATIENT)
Dept: OBSTETRICS AND GYNECOLOGY | Facility: CLINIC | Age: 23
End: 2018-07-12

## 2018-07-12 DIAGNOSIS — Z86.19 HISTORY OF HERPES GENITALIS: ICD-10-CM

## 2018-07-12 RX ORDER — VALACYCLOVIR HYDROCHLORIDE 1 G/1
1000 TABLET, FILM COATED ORAL EVERY 12 HOURS
Qty: 10 TABLET | Refills: 6 | Status: SHIPPED | OUTPATIENT
Start: 2018-07-12

## 2019-08-20 ENCOUNTER — OFFICE VISIT (OUTPATIENT)
Dept: URGENT CARE | Facility: CLINIC | Age: 24
End: 2019-08-20
Payer: COMMERCIAL

## 2019-08-20 VITALS
TEMPERATURE: 98 F | BODY MASS INDEX: 28.71 KG/M2 | RESPIRATION RATE: 19 BRPM | OXYGEN SATURATION: 100 % | HEIGHT: 62 IN | DIASTOLIC BLOOD PRESSURE: 75 MMHG | WEIGHT: 156 LBS | SYSTOLIC BLOOD PRESSURE: 115 MMHG | HEART RATE: 86 BPM

## 2019-08-20 DIAGNOSIS — H57.89 IRRITATION OF RIGHT EYE: Primary | ICD-10-CM

## 2019-08-20 PROCEDURE — 3008F PR BODY MASS INDEX (BMI) DOCUMENTED: ICD-10-PCS | Mod: CPTII,S$GLB,, | Performed by: NURSE PRACTITIONER

## 2019-08-20 PROCEDURE — 99214 PR OFFICE/OUTPT VISIT, EST, LEVL IV, 30-39 MIN: ICD-10-PCS | Mod: S$GLB,,, | Performed by: NURSE PRACTITIONER

## 2019-08-20 PROCEDURE — 99214 OFFICE O/P EST MOD 30 MIN: CPT | Mod: S$GLB,,, | Performed by: NURSE PRACTITIONER

## 2019-08-20 PROCEDURE — 3008F BODY MASS INDEX DOCD: CPT | Mod: CPTII,S$GLB,, | Performed by: NURSE PRACTITIONER

## 2019-08-20 RX ORDER — METHYLPREDNISOLONE 4 MG/1
4 TABLET ORAL
Qty: 1 PACKAGE | Refills: 0 | Status: SHIPPED | OUTPATIENT
Start: 2019-08-20 | End: 2019-08-26

## 2019-08-20 RX ORDER — ACETAMINOPHEN AND CODEINE PHOSPHATE 120; 12 MG/5ML; MG/5ML
0.35 SOLUTION ORAL
COMMUNITY
Start: 2018-09-21 | End: 2019-09-21

## 2019-08-20 NOTE — PATIENT INSTRUCTIONS
Conjunctivitis, Nonspecific    The membrane that covers the white part of your eye (the conjunctiva) is inflamed. Inflammation happens when your body responds to an injury, allergic reaction, infection, or illness. Symptoms of inflammation in the eye may include redness, irritation, itching, swelling, or burning. These symptoms should go away within the next 24 hours. Conjunctivitis may be related to a particle that was in your eye. If so, it may wash out with your tears or irrigation treatment. Being exposed to liquid chemicals or fumes may also cause this reaction.   Home care  · Apply a cold pack (ice in a plastic bag, wrapped in a towel) over the eye for 20 minutes at a time. This will reduce pain.  · Artificial tears may be prescribed to reduce irritation or redness.  These should be used 3 to 4 times a day.  · You may use acetaminophen or ibuprofen to control pain, unless another medicine was prescribed.(Note: If you have chronic liver or kidney disease, or if you have ever had a stomach ulcer or gastrointestinal bleeding, talk with your healthcare provider before using these medicines.)  Follow-up care  Follow up with your healthcare provider, or as advised.  When to seek medical advice  Call your healthcare provider right away if any of these occur:  · Increased eyelid swelling  · Increased eye pain  · Increased redness or drainage from the eye  · Increased blurry vision or increased sensitivity to light  · Failure of normal vision to return within 24 to 48 hours  Date Last Reviewed: 6/14/2015  © 9112-3290 [employer]. [address] [place] [address], [place] [address]. All rights reserved. This information is not intended as a substitute for professional medical care. Always follow your healthcare professional's instructions.      -Over the counter eye drops as needed.  -Medrol dose pack.  -Follow up with your primary care doctor and Rheumatology as we discussed.  Please follow up with your Primary  care provider within 2-5 days if your signs and symptoms have not resolved or worsen.     If your condition worsens or fails to improve we recommend that you receive another evaluation at the emergency room immediately or contact your primary medical clinic to discuss your concerns.   You must understand that you have received an Urgent Care treatment only and that you may be released before all of your medical problems are known or treated. You, the patient, will arrange for follow up care as instructed.

## 2019-08-20 NOTE — PROGRESS NOTES
"Subjective:       Patient ID: Dariela Gaitan is a 23 y.o. female.    Vitals:  height is 5' 2" (1.575 m) and weight is 70.8 kg (156 lb). Her oral temperature is 98.3 °F (36.8 °C). Her blood pressure is 115/75 and her pulse is 86. Her respiration is 19 and oxygen saturation is 100%.     Chief Complaint: Eye Problem    Patient presents to clinic today with complaints of right eye irritation over last 3-4 days.  Patient reports happens frequently due to her history of lupus.  Patient reports that normally she receives a few days of steroids was ready case symptoms.  Patient is wearing glasses clinic today.  Denies the use of contacts at.  Denies trauma or foreign bodies to the eye.  Patient reports that this happened at least 4-5 times over the last few years.  Patient denies any recent illness. Denies fever or chills.  Denies any drainage.     Eye Problem    The right eye is affected. This is a recurrent problem. The current episode started in the past 7 days (3 days). The problem occurs constantly. The problem has been unchanged. There was no injury mechanism. The pain is at a severity of 2/10. The pain is mild. There is no known exposure to pink eye. She does not wear contacts. Associated symptoms include eye redness. Pertinent negatives include no blurred vision, eye discharge, double vision, fever, foreign body sensation, itching, nausea, photophobia, recent URI or vomiting. She has tried eye drops for the symptoms. The treatment provided mild relief.       Constitution: Negative for chills and fever.   HENT: Negative for congestion and sinus pain.    Eyes: Positive for eye redness. Negative for eye trauma, foreign body in eye, eye discharge, eye itching, eye pain, photophobia, vision loss, double vision, blurred vision and eyelid swelling.   Gastrointestinal: Negative for nausea and vomiting.   Genitourinary: Negative for history of kidney stones.   Skin: Negative for rash.   Allergic/Immunologic: Negative " for seasonal allergies and itching.   Neurological: Negative for headaches.       Objective:      Physical Exam   Constitutional: She is oriented to person, place, and time. She appears well-developed and well-nourished.   HENT:   Head: Normocephalic and atraumatic.   Right Ear: External ear normal.   Left Ear: External ear normal.   Nose: Nose normal.   Mouth/Throat: Oropharynx is clear and moist.   Eyes: Pupils are equal, round, and reactive to light. EOM and lids are normal. Lids are everted and swept, no foreign bodies found. Right conjunctiva is injected.   Patient defers fluorescein evaluation.   Neck: Trachea normal, full passive range of motion without pain and phonation normal. Neck supple.   Musculoskeletal: Normal range of motion.   Neurological: She is alert and oriented to person, place, and time.   Skin: Skin is warm, dry and intact.   Psychiatric: She has a normal mood and affect. Her speech is normal and behavior is normal. Judgment and thought content normal. Cognition and memory are normal.   Nursing note and vitals reviewed.      Assessment:       1. Irritation of right eye        Plan:         Patient presents to clinic today with complaints of right eye irritation over last 3-4 days.  Patient reports this happens frequently and steroids helped to improve symptoms.  Patient denies any changes in vision.  Denies any recent illness.  Patient reports that she has and appointment with her rheumatologist in 2 weeks.  Patient defers an eye exam with the fluorescein or black light today.  Patient reports that this happens frequently and she knows exactly what to do to help improve the symptoms.    Irritation of right eye  -     methylPREDNISolone (MEDROL DOSEPACK) 4 mg tablet; Take 1 tablet (4 mg total) by mouth as needed (Take as directed). Take as directed  Dispense: 1 Package; Refill: 0      Patient Instructions     Conjunctivitis, Nonspecific    The membrane that covers the white part of your eye  (the conjunctiva) is inflamed. Inflammation happens when your body responds to an injury, allergic reaction, infection, or illness. Symptoms of inflammation in the eye may include redness, irritation, itching, swelling, or burning. These symptoms should go away within the next 24 hours. Conjunctivitis may be related to a particle that was in your eye. If so, it may wash out with your tears or irrigation treatment. Being exposed to liquid chemicals or fumes may also cause this reaction.   Home care  · Apply a cold pack (ice in a plastic bag, wrapped in a towel) over the eye for 20 minutes at a time. This will reduce pain.  · Artificial tears may be prescribed to reduce irritation or redness.  These should be used 3 to 4 times a day.  · You may use acetaminophen or ibuprofen to control pain, unless another medicine was prescribed.(Note: If you have chronic liver or kidney disease, or if you have ever had a stomach ulcer or gastrointestinal bleeding, talk with your healthcare provider before using these medicines.)  Follow-up care  Follow up with your healthcare provider, or as advised.  When to seek medical advice  Call your healthcare provider right away if any of these occur:  · Increased eyelid swelling  · Increased eye pain  · Increased redness or drainage from the eye  · Increased blurry vision or increased sensitivity to light  · Failure of normal vision to return within 24 to 48 hours  Date Last Reviewed: 6/14/2015  © 8861-2431 [employer]. [address] [place] [address]. All rights reserved. This information is not intended as a substitute for professional medical care. Always follow your healthcare professional's instructions.      -Over the counter eye drops as needed.  -Medrol dose pack.  -Follow up with your primary care doctor and Rheumatology as we discussed.  Please follow up with your Primary care provider within 2-5 days if your signs and symptoms have not resolved or worsen.      If your condition worsens or fails to improve we recommend that you receive another evaluation at the emergency room immediately or contact your primary medical clinic to discuss your concerns.   You must understand that you have received an Urgent Care treatment only and that you may be released before all of your medical problems are known or treated. You, the patient, will arrange for follow up care as instructed.

## 2021-04-29 ENCOUNTER — PATIENT MESSAGE (OUTPATIENT)
Dept: RESEARCH | Facility: HOSPITAL | Age: 26
End: 2021-04-29

## 2024-11-19 ENCOUNTER — PATIENT MESSAGE (OUTPATIENT)
Dept: GASTROENTEROLOGY | Facility: CLINIC | Age: 29
End: 2024-11-19
Payer: COMMERCIAL